# Patient Record
(demographics unavailable — no encounter records)

---

## 2025-04-16 NOTE — HISTORY OF PRESENT ILLNESS
[LMP unknown] : LMP unknown [Yes] : pregnancy [unknown] : Patient is unsure of the date of her LMP [FreeTextEntry1] : PATIENT PRESENT FOR ANNUAL GYN EXAM/ VAGINAL IRRITATION/ DRYNESS. She reports burning when urine passes over the vagina, for the past few weeks, along with external vulvar itching. She also reports a small lesion of the right vulva for the past few weeks, that burns/hurts when touched but no bleeding or discharge from the lesion. She denies any pelvic pain, postmenopausal bleeding, vaginal discharge, abdominal pain, fever, chills. She denies sexual activity for many years.  [Patient reported mammogram was normal] : Patient reported mammogram was normal [Patient reported PAP Smear was normal] : Patient reported PAP Smear was normal [HIV test declined] : HIV test declined [Syphilis test declined] : Syphilis test declined [Gonorrhea test offered] : Gonorrhea test offered [Chlamydia test offered] : Chlamydia test offered [Trichomonas test offered] : Trichomonas test offered [HPV test offered] : HPV test offered [Hepatitis B test declined] : Hepatitis B test declined [Hepatitis C test declined] : Hepatitis C test declined [Mammogramdate] : 10/24/24 [PapSmeardate] : 7/24/23 [ColonoscopyDate] : 2018 [PGHxTotal] : 4 [[facility]] : 3 [[facility]] : 3 [PGHxABSpont] : 1

## 2025-04-16 NOTE — REVIEW OF SYSTEMS
[Urgency] : no urgency [Frequency] : no frequency [Incontinence] : no incontinence [Dysuria] : no dysuria [Urethral Discharge] : no urethral discharge [Abn Vaginal bleeding] : no abnormal vaginal bleeding [Pelvic pain] : no pelvic pain [CVA Pain] : no CVA pain [Genital Rash/Irritation] : genital rash/irritation [Negative] : Heme/Lymph

## 2025-04-16 NOTE — COUNSELING
[Nutrition/ Exercise/ Weight Management] : nutrition, exercise, weight management [Body Image] : body image [Vitamins/Supplements] : vitamins/supplements [Sunscreen] : sunscreen [Breast Self Exam] : breast self exam [Bladder Hygiene] : bladder hygiene [Confidentiality] : confidentiality [STD (testing, results, tx)] : STD (testing, results, tx)

## 2025-04-16 NOTE — PLAN
[FreeTextEntry1] : GYN exam: Pap/HPV. Mammogram up to date (10/24 BIRADS-1 result); pt will do annual mammography 10/25.  Vaginal burning: Vaginitis panel collected. Discussed with pt that symptoms may be due to menopausal changes; discussed lifestyle measures such as Replens vaginal moisturizer, gentle/unscented soaps and laundry detergent. Lotrisone cream prescribed for external itch/burn symptoms. UA/culture also ordered due to microscopic hematuria on urine dip today. Right labial lesion/erosion: HSV culture collected.  RTO one week for results/treatment as indicated, as needed, and for annual GYN exam.

## 2025-07-10 NOTE — PHYSICAL EXAM
Brief Postoperative Note      Patient: Carlito Alonzo  YOB: 1954  MRN: [id number]    Date of Procedure: 7/10/2025    Pre-Op Diagnosis Codes:      * Ductal carcinoma in situ (DCIS) of left breast [D05.12]    Post-Op Diagnosis: Post-Op Diagnosis Codes:     * Ductal carcinoma in situ (DCIS) of left breast [D05.12]       Procedure(s):  LEFT BREAST LUMPECTOMY WITH NEEDLE LOCALIZATION    Surgeon(s):  Gio Paulson MD    Assistant:  Surgical Assistant: Eddi Graves    Anesthesia: General    Estimated Blood Loss (mL): Minimal    Complications: None    Specimens:   ID Type Source Tests Collected by Time Destination   1 : Left breast at 9 o'clock, 2 long superior, 2 short medial Tissue Breast SURGICAL PATHOLOGY Gio Paulson MD 7/10/2025 1126        Implants:  * No implants in log *      Drains: * No LDAs found *    Findings:  Infection Present At Time Of Surgery (PATOS) (choose all levels that have infection present):  No infection present  Other Findings: clip in specimen  SLNB not indicated only DCIS       Electronically signed by Gio Paulson MD on 7/10/2025 at 11:44 AM   [Chaperoned Physical Exam] : A chaperone was present in the examining room during all aspects of the physical examination. [MA] : MA [Appropriately responsive] : appropriately responsive [Alert] : alert [No Acute Distress] : no acute distress [Soft] : soft [Non-tender] : non-tender [No Lesions] : no lesions [No Mass] : no mass [Examination Of The Breasts] : a normal appearance [No Discharge] : no discharge [No Masses] : no breast masses were palpable [Labia Majora] : normal [Labia Minora] : normal [Atrophy] : atrophy [Erythematous] : erythema [No Bleeding] : There was no active vaginal bleeding [Normal] : normal [Uterine Adnexae] : non-palpable [Declined] : Patient declined rectal exam [FreeTextEntry2] : single, small (~5mm) erosion on R labium majorum